# Patient Record
Sex: MALE | Race: WHITE | Employment: OTHER | URBAN - METROPOLITAN AREA
[De-identification: names, ages, dates, MRNs, and addresses within clinical notes are randomized per-mention and may not be internally consistent; named-entity substitution may affect disease eponyms.]

---

## 2024-11-09 RX ORDER — ASPIRIN 81 MG/1
81 TABLET ORAL DAILY
COMMUNITY
Start: 2024-08-26

## 2024-11-14 NOTE — H&P
[facility]    PATIENT'S NAME: TELMA CLEARY   ATTENDING PHYSICIAN: Jeremiah Tapia MD   PATIENT ACCOUNT#:   [id number]    LOCATION:    MEDICAL RECORD #:   [id number]       YOB: 1955  ADMISSION DATE:       11/20/2024    HISTORY AND PHYSICAL EXAMINATION    HISTORY OF PRESENT ILLNESS:  The patient is a 68-year-old gentleman who is being followed for left shoulder pain.  He had radiographs obtained which demonstrate glenohumeral joint osteoarthritis.    PAST MEDICAL HISTORY:  Significant for hypertension, sleep apnea, diabetes, hypothyroidism.    PAST SURGICAL HISTORY:  Significant for hernia repair, rotator cuff repair of his right shoulder, spine surgery x2, total knee replacement.    MEDICATIONS:  His current medications are aspirin, atorvastatin, bupropion, calcium carbonate, vitamin D, candesartan, citalopram, Jardiance, gabapentin, levothyroxine, krill oil, metformin, multivitamins, semaglutide, Toujeo.    PHYSICAL EXAMINATION:  His left shoulder demonstrates his elevation to 150, external rotation to 30, internal rotation to gluteus, crepitus with range of motion exercises.  Anterior and posterior joint line tenderness.  Pain with loading of his joint.  His strength of elevation, external rotation and internal rotation is 4+/5.      Radiographs demonstrate end-stage glenohumeral joint osteoarthritis of the left shoulder.     ASSESSMENT AND PLAN:  The patient is being followed for left glenohumeral joint osteoarthritis.  At this time, he has failed conservative management.  We will proceed with a reverse total shoulder arthroplasty.  We discussed all the risks and complications associated with surgery, and he would like to proceed at this time.      Aminta Martin PA-C, dictating for Jeremiah Tapia MD.     Dictated By Jeremiah Tapia MD  d: 11/14/2024 11:00:23  t: 11/14/2024 11:33:46  [facility] [id number]  /

## 2024-11-15 RX ORDER — LEVOTHYROXINE SODIUM 150 UG/1
150 TABLET ORAL
COMMUNITY
Start: 2024-11-05

## 2024-11-15 RX ORDER — CANDESARTAN 8 MG/1
8 TABLET ORAL DAILY
COMMUNITY
Start: 2024-06-06

## 2024-11-15 RX ORDER — INSULIN GLARGINE 300 U/ML
70 INJECTION, SOLUTION SUBCUTANEOUS NIGHTLY
COMMUNITY
Start: 2024-05-29

## 2024-11-15 RX ORDER — BUPROPION HYDROCHLORIDE 300 MG/1
300 TABLET ORAL DAILY
COMMUNITY
Start: 2024-06-06

## 2024-11-15 RX ORDER — ATORVASTATIN CALCIUM 80 MG/1
80 TABLET, FILM COATED ORAL DAILY
COMMUNITY
Start: 2024-08-07

## 2024-11-15 RX ORDER — CITALOPRAM HYDROBROMIDE 20 MG/1
20 TABLET ORAL DAILY
COMMUNITY
Start: 2024-11-05

## 2024-11-15 RX ORDER — CALCIUM CARBONATE/VITAMIN D3 500 MG-10
TABLET ORAL
COMMUNITY

## 2024-11-15 RX ORDER — GABAPENTIN 300 MG/1
2 CAPSULE ORAL 2 TIMES DAILY
COMMUNITY
Start: 2024-11-05

## 2024-11-20 ENCOUNTER — ANESTHESIA EVENT (OUTPATIENT)
Dept: SURGERY | Facility: HOSPITAL | Age: 69
End: 2024-11-20
Payer: MEDICARE

## 2024-11-20 ENCOUNTER — APPOINTMENT (OUTPATIENT)
Dept: GENERAL RADIOLOGY | Facility: HOSPITAL | Age: 69
End: 2024-11-20
Attending: ORTHOPAEDIC SURGERY
Payer: MEDICARE

## 2024-11-20 ENCOUNTER — HOSPITAL ENCOUNTER (OUTPATIENT)
Facility: HOSPITAL | Age: 69
Discharge: HOME | End: 2024-11-21
Attending: ORTHOPAEDIC SURGERY | Admitting: ORTHOPAEDIC SURGERY
Payer: MEDICARE

## 2024-11-20 ENCOUNTER — ANESTHESIA (OUTPATIENT)
Dept: SURGERY | Facility: HOSPITAL | Age: 69
End: 2024-11-20
Payer: MEDICARE

## 2024-11-20 PROBLEM — M19.012 OSTEOARTHRITIS OF LEFT GLENOHUMERAL JOINT: Status: ACTIVE | Noted: 2024-11-20

## 2024-11-20 PROBLEM — G47.33 OSA (OBSTRUCTIVE SLEEP APNEA): Status: ACTIVE | Noted: 2024-11-20

## 2024-11-20 PROBLEM — E78.5 HYPERLIPIDEMIA: Status: ACTIVE | Noted: 2024-11-20

## 2024-11-20 PROBLEM — Z79.4 INSULIN DEPENDENT TYPE 2 DIABETES MELLITUS (HCC): Status: ACTIVE | Noted: 2024-11-20

## 2024-11-20 PROBLEM — I10 ESSENTIAL HYPERTENSION: Status: ACTIVE | Noted: 2024-11-20

## 2024-11-20 PROBLEM — E10.9 INSULIN DEPENDENT DIABETES MELLITUS TYPE IA (HCC): Status: ACTIVE | Noted: 2024-11-20

## 2024-11-20 PROBLEM — Z96.612 S/P REVERSE TOTAL SHOULDER ARTHROPLASTY, LEFT: Status: ACTIVE | Noted: 2024-11-20

## 2024-11-20 PROBLEM — E11.9 INSULIN DEPENDENT TYPE 2 DIABETES MELLITUS (HCC): Status: ACTIVE | Noted: 2024-11-20

## 2024-11-20 PROBLEM — E03.9 HYPOTHYROIDISM: Status: ACTIVE | Noted: 2024-11-20

## 2024-11-20 PROBLEM — E10.9 INSULIN DEPENDENT DIABETES MELLITUS TYPE IA (HCC): Status: RESOLVED | Noted: 2024-11-20 | Resolved: 2024-11-20

## 2024-11-20 LAB
GLUCOSE BLDC GLUCOMTR-MCNC: 103 MG/DL (ref 70–99)
GLUCOSE BLDC GLUCOMTR-MCNC: 119 MG/DL (ref 70–99)
GLUCOSE BLDC GLUCOMTR-MCNC: 123 MG/DL (ref 70–99)
GLUCOSE BLDC GLUCOMTR-MCNC: 182 MG/DL (ref 70–99)
GLUCOSE BLDC GLUCOMTR-MCNC: 244 MG/DL (ref 70–99)

## 2024-11-20 PROCEDURE — 99222 1ST HOSP IP/OBS MODERATE 55: CPT | Performed by: HOSPITALIST

## 2024-11-20 PROCEDURE — 0LS40ZZ REPOSITION LEFT UPPER ARM TENDON, OPEN APPROACH: ICD-10-PCS | Performed by: ORTHOPAEDIC SURGERY

## 2024-11-20 PROCEDURE — 73020 X-RAY EXAM OF SHOULDER: CPT | Performed by: ORTHOPAEDIC SURGERY

## 2024-11-20 PROCEDURE — 0RRK00Z REPLACEMENT OF LEFT SHOULDER JOINT WITH REVERSE BALL AND SOCKET SYNTHETIC SUBSTITUTE, OPEN APPROACH: ICD-10-PCS | Performed by: ORTHOPAEDIC SURGERY

## 2024-11-20 DEVICE — IMPLANTABLE DEVICE
Type: IMPLANTABLE DEVICE | Site: SHOULDER | Status: FUNCTIONAL
Brand: COMPREHENSIVE® VERSA-DIAL®

## 2024-11-20 DEVICE — IMPLANTABLE DEVICE
Type: IMPLANTABLE DEVICE | Site: SHOULDER | Status: FUNCTIONAL
Brand: COMPREHENSIVE® REVERSE SHOULDER

## 2024-11-20 DEVICE — IMPLANTABLE DEVICE
Type: IMPLANTABLE DEVICE | Site: SHOULDER | Status: FUNCTIONAL
Brand: COMPREHENSIVE® VIVACIT-E®

## 2024-11-20 DEVICE — IMPLANTABLE DEVICE
Type: IMPLANTABLE DEVICE | Site: SHOULDER | Status: FUNCTIONAL
Brand: IDENTITY™

## 2024-11-20 DEVICE — IMPLANTABLE DEVICE: Type: IMPLANTABLE DEVICE | Site: SHOULDER | Status: FUNCTIONAL

## 2024-11-20 RX ORDER — DEXAMETHASONE SODIUM PHOSPHATE 4 MG/ML
VIAL (ML) INJECTION AS NEEDED
Status: DISCONTINUED | OUTPATIENT
Start: 2024-11-20 | End: 2024-11-20 | Stop reason: SURG

## 2024-11-20 RX ORDER — HYDROCODONE BITARTRATE AND ACETAMINOPHEN 10; 325 MG/1; MG/1
2 TABLET ORAL EVERY 6 HOURS PRN
Status: DISCONTINUED | OUTPATIENT
Start: 2024-11-20 | End: 2024-11-21

## 2024-11-20 RX ORDER — MORPHINE SULFATE 4 MG/ML
4 INJECTION, SOLUTION INTRAMUSCULAR; INTRAVENOUS EVERY 10 MIN PRN
Status: DISCONTINUED | OUTPATIENT
Start: 2024-11-20 | End: 2024-11-20 | Stop reason: HOSPADM

## 2024-11-20 RX ORDER — ONDANSETRON 2 MG/ML
4 INJECTION INTRAMUSCULAR; INTRAVENOUS EVERY 6 HOURS PRN
Status: DISCONTINUED | OUTPATIENT
Start: 2024-11-20 | End: 2024-11-21

## 2024-11-20 RX ORDER — ROCURONIUM BROMIDE 10 MG/ML
INJECTION, SOLUTION INTRAVENOUS AS NEEDED
Status: DISCONTINUED | OUTPATIENT
Start: 2024-11-20 | End: 2024-11-20 | Stop reason: SURG

## 2024-11-20 RX ORDER — HYDROMORPHONE HYDROCHLORIDE 1 MG/ML
0.4 INJECTION, SOLUTION INTRAMUSCULAR; INTRAVENOUS; SUBCUTANEOUS EVERY 5 MIN PRN
Status: DISCONTINUED | OUTPATIENT
Start: 2024-11-20 | End: 2024-11-20 | Stop reason: HOSPADM

## 2024-11-20 RX ORDER — NICOTINE POLACRILEX 4 MG
15 LOZENGE BUCCAL
Status: DISCONTINUED | OUTPATIENT
Start: 2024-11-20 | End: 2024-11-21

## 2024-11-20 RX ORDER — GLYCOPYRROLATE 0.2 MG/ML
INJECTION, SOLUTION INTRAMUSCULAR; INTRAVENOUS AS NEEDED
Status: DISCONTINUED | OUTPATIENT
Start: 2024-11-20 | End: 2024-11-20 | Stop reason: SURG

## 2024-11-20 RX ORDER — SODIUM PHOSPHATE, DIBASIC AND SODIUM PHOSPHATE, MONOBASIC 7; 19 G/230ML; G/230ML
1 ENEMA RECTAL ONCE AS NEEDED
Status: DISCONTINUED | OUTPATIENT
Start: 2024-11-20 | End: 2024-11-21

## 2024-11-20 RX ORDER — VANCOMYCIN HYDROCHLORIDE
15 ONCE
Status: COMPLETED | OUTPATIENT
Start: 2024-11-20 | End: 2024-11-20

## 2024-11-20 RX ORDER — NICOTINE POLACRILEX 4 MG
30 LOZENGE BUCCAL
Status: DISCONTINUED | OUTPATIENT
Start: 2024-11-20 | End: 2024-11-20 | Stop reason: HOSPADM

## 2024-11-20 RX ORDER — LEVOTHYROXINE SODIUM 75 UG/1
150 TABLET ORAL
Status: DISCONTINUED | OUTPATIENT
Start: 2024-11-21 | End: 2024-11-21

## 2024-11-20 RX ORDER — NICOTINE POLACRILEX 4 MG
30 LOZENGE BUCCAL
Status: DISCONTINUED | OUTPATIENT
Start: 2024-11-20 | End: 2024-11-21

## 2024-11-20 RX ORDER — LOSARTAN POTASSIUM 50 MG/1
50 TABLET ORAL DAILY
Status: DISCONTINUED | OUTPATIENT
Start: 2024-11-21 | End: 2024-11-21

## 2024-11-20 RX ORDER — METOCLOPRAMIDE HYDROCHLORIDE 5 MG/ML
10 INJECTION INTRAMUSCULAR; INTRAVENOUS EVERY 8 HOURS PRN
Status: DISCONTINUED | OUTPATIENT
Start: 2024-11-20 | End: 2024-11-21

## 2024-11-20 RX ORDER — DOCUSATE SODIUM 100 MG/1
100 CAPSULE, LIQUID FILLED ORAL 2 TIMES DAILY
Status: DISCONTINUED | OUTPATIENT
Start: 2024-11-20 | End: 2024-11-21

## 2024-11-20 RX ORDER — POLYETHYLENE GLYCOL 3350 17 G/17G
17 POWDER, FOR SOLUTION ORAL DAILY PRN
Status: DISCONTINUED | OUTPATIENT
Start: 2024-11-20 | End: 2024-11-21

## 2024-11-20 RX ORDER — DEXTROSE MONOHYDRATE 25 G/50ML
50 INJECTION, SOLUTION INTRAVENOUS
Status: DISCONTINUED | OUTPATIENT
Start: 2024-11-20 | End: 2024-11-20 | Stop reason: HOSPADM

## 2024-11-20 RX ORDER — INSULIN DEGLUDEC 100 U/ML
70 INJECTION, SOLUTION SUBCUTANEOUS NIGHTLY
Status: DISCONTINUED | OUTPATIENT
Start: 2024-11-20 | End: 2024-11-21

## 2024-11-20 RX ORDER — BUPROPION HYDROCHLORIDE 150 MG/1
300 TABLET ORAL DAILY
Status: DISCONTINUED | OUTPATIENT
Start: 2024-11-21 | End: 2024-11-21

## 2024-11-20 RX ORDER — HYDROMORPHONE HYDROCHLORIDE 1 MG/ML
0.6 INJECTION, SOLUTION INTRAMUSCULAR; INTRAVENOUS; SUBCUTANEOUS EVERY 5 MIN PRN
Status: DISCONTINUED | OUTPATIENT
Start: 2024-11-20 | End: 2024-11-20 | Stop reason: HOSPADM

## 2024-11-20 RX ORDER — NALOXONE HYDROCHLORIDE 0.4 MG/ML
0.08 INJECTION, SOLUTION INTRAMUSCULAR; INTRAVENOUS; SUBCUTANEOUS AS NEEDED
Status: DISCONTINUED | OUTPATIENT
Start: 2024-11-20 | End: 2024-11-20 | Stop reason: HOSPADM

## 2024-11-20 RX ORDER — LIDOCAINE HYDROCHLORIDE 20 MG/ML
INJECTION, SOLUTION EPIDURAL; INFILTRATION; INTRACAUDAL; PERINEURAL AS NEEDED
Status: DISCONTINUED | OUTPATIENT
Start: 2024-11-20 | End: 2024-11-20 | Stop reason: SURG

## 2024-11-20 RX ORDER — SODIUM CHLORIDE, SODIUM LACTATE, POTASSIUM CHLORIDE, CALCIUM CHLORIDE 600; 310; 30; 20 MG/100ML; MG/100ML; MG/100ML; MG/100ML
INJECTION, SOLUTION INTRAVENOUS CONTINUOUS
Status: DISCONTINUED | OUTPATIENT
Start: 2024-11-20 | End: 2024-11-21

## 2024-11-20 RX ORDER — ACETAMINOPHEN 500 MG
1000 TABLET ORAL ONCE
Status: COMPLETED | OUTPATIENT
Start: 2024-11-20 | End: 2024-11-20

## 2024-11-20 RX ORDER — MORPHINE SULFATE 4 MG/ML
2 INJECTION, SOLUTION INTRAMUSCULAR; INTRAVENOUS EVERY 10 MIN PRN
Status: DISCONTINUED | OUTPATIENT
Start: 2024-11-20 | End: 2024-11-20 | Stop reason: HOSPADM

## 2024-11-20 RX ORDER — ATORVASTATIN CALCIUM 40 MG/1
80 TABLET, FILM COATED ORAL DAILY
Status: DISCONTINUED | OUTPATIENT
Start: 2024-11-21 | End: 2024-11-21

## 2024-11-20 RX ORDER — HYDROMORPHONE HYDROCHLORIDE 1 MG/ML
0.2 INJECTION, SOLUTION INTRAMUSCULAR; INTRAVENOUS; SUBCUTANEOUS EVERY 5 MIN PRN
Status: DISCONTINUED | OUTPATIENT
Start: 2024-11-20 | End: 2024-11-20 | Stop reason: HOSPADM

## 2024-11-20 RX ORDER — DIPHENHYDRAMINE HYDROCHLORIDE 50 MG/ML
25 INJECTION INTRAMUSCULAR; INTRAVENOUS ONCE AS NEEDED
Status: ACTIVE | OUTPATIENT
Start: 2024-11-20 | End: 2024-11-20

## 2024-11-20 RX ORDER — PHENYLEPHRINE HCL 10 MG/ML
VIAL (ML) INJECTION AS NEEDED
Status: DISCONTINUED | OUTPATIENT
Start: 2024-11-20 | End: 2024-11-20 | Stop reason: SURG

## 2024-11-20 RX ORDER — SODIUM CHLORIDE, SODIUM LACTATE, POTASSIUM CHLORIDE, CALCIUM CHLORIDE 600; 310; 30; 20 MG/100ML; MG/100ML; MG/100ML; MG/100ML
INJECTION, SOLUTION INTRAVENOUS CONTINUOUS
Status: DISCONTINUED | OUTPATIENT
Start: 2024-11-20 | End: 2024-11-20 | Stop reason: HOSPADM

## 2024-11-20 RX ORDER — FAMOTIDINE 20 MG/1
20 TABLET, FILM COATED ORAL ONCE
Status: DISCONTINUED | OUTPATIENT
Start: 2024-11-20 | End: 2024-11-20 | Stop reason: HOSPADM

## 2024-11-20 RX ORDER — TRANEXAMIC ACID 10 MG/ML
INJECTION, SOLUTION INTRAVENOUS AS NEEDED
Status: DISCONTINUED | OUTPATIENT
Start: 2024-11-20 | End: 2024-11-20 | Stop reason: SURG

## 2024-11-20 RX ORDER — ESCITALOPRAM OXALATE 5 MG/1
20 TABLET ORAL DAILY
Status: DISCONTINUED | OUTPATIENT
Start: 2024-11-21 | End: 2024-11-21

## 2024-11-20 RX ORDER — MORPHINE SULFATE 10 MG/ML
6 INJECTION, SOLUTION INTRAMUSCULAR; INTRAVENOUS EVERY 10 MIN PRN
Status: DISCONTINUED | OUTPATIENT
Start: 2024-11-20 | End: 2024-11-20 | Stop reason: HOSPADM

## 2024-11-20 RX ORDER — MORPHINE SULFATE 15 MG/1
15 TABLET, FILM COATED, EXTENDED RELEASE ORAL EVERY 12 HOURS SCHEDULED
Status: DISCONTINUED | OUTPATIENT
Start: 2024-11-20 | End: 2024-11-21

## 2024-11-20 RX ORDER — NICOTINE POLACRILEX 4 MG
15 LOZENGE BUCCAL
Status: DISCONTINUED | OUTPATIENT
Start: 2024-11-20 | End: 2024-11-20 | Stop reason: HOSPADM

## 2024-11-20 RX ORDER — MIDAZOLAM HYDROCHLORIDE 1 MG/ML
INJECTION INTRAMUSCULAR; INTRAVENOUS AS NEEDED
Status: DISCONTINUED | OUTPATIENT
Start: 2024-11-20 | End: 2024-11-20 | Stop reason: SURG

## 2024-11-20 RX ORDER — ONDANSETRON 2 MG/ML
INJECTION INTRAMUSCULAR; INTRAVENOUS AS NEEDED
Status: DISCONTINUED | OUTPATIENT
Start: 2024-11-20 | End: 2024-11-20 | Stop reason: SURG

## 2024-11-20 RX ORDER — GABAPENTIN 300 MG/1
600 CAPSULE ORAL 2 TIMES DAILY
Status: DISCONTINUED | OUTPATIENT
Start: 2024-11-20 | End: 2024-11-21

## 2024-11-20 RX ORDER — SENNOSIDES 8.6 MG
17.2 TABLET ORAL NIGHTLY
Status: DISCONTINUED | OUTPATIENT
Start: 2024-11-20 | End: 2024-11-21

## 2024-11-20 RX ORDER — FAMOTIDINE 10 MG/ML
20 INJECTION, SOLUTION INTRAVENOUS ONCE
Status: DISCONTINUED | OUTPATIENT
Start: 2024-11-20 | End: 2024-11-20 | Stop reason: HOSPADM

## 2024-11-20 RX ORDER — HYDROCODONE BITARTRATE AND ACETAMINOPHEN 10; 325 MG/1; MG/1
1 TABLET ORAL EVERY 6 HOURS PRN
Status: DISCONTINUED | OUTPATIENT
Start: 2024-11-20 | End: 2024-11-21

## 2024-11-20 RX ORDER — BISACODYL 10 MG
10 SUPPOSITORY, RECTAL RECTAL
Status: DISCONTINUED | OUTPATIENT
Start: 2024-11-20 | End: 2024-11-21

## 2024-11-20 RX ORDER — DEXTROSE MONOHYDRATE 25 G/50ML
50 INJECTION, SOLUTION INTRAVENOUS
Status: DISCONTINUED | OUTPATIENT
Start: 2024-11-20 | End: 2024-11-21

## 2024-11-20 RX ADMIN — PHENYLEPHRINE HCL 100 MCG: 10 MG/ML VIAL (ML) INJECTION at 11:56:00

## 2024-11-20 RX ADMIN — ONDANSETRON 4 MG: 2 INJECTION INTRAMUSCULAR; INTRAVENOUS at 13:00:00

## 2024-11-20 RX ADMIN — PHENYLEPHRINE HCL 100 MCG: 10 MG/ML VIAL (ML) INJECTION at 11:30:00

## 2024-11-20 RX ADMIN — PHENYLEPHRINE HCL 100 MCG: 10 MG/ML VIAL (ML) INJECTION at 12:03:00

## 2024-11-20 RX ADMIN — TRANEXAMIC ACID 1000 MG: 10 INJECTION, SOLUTION INTRAVENOUS at 11:30:00

## 2024-11-20 RX ADMIN — GLYCOPYRROLATE 0.15 MG: 0.2 INJECTION, SOLUTION INTRAMUSCULAR; INTRAVENOUS at 11:23:00

## 2024-11-20 RX ADMIN — PHENYLEPHRINE HCL 100 MCG: 10 MG/ML VIAL (ML) INJECTION at 11:51:00

## 2024-11-20 RX ADMIN — DEXAMETHASONE SODIUM PHOSPHATE 4 MG: 4 MG/ML VIAL (ML) INJECTION at 11:27:00

## 2024-11-20 RX ADMIN — PHENYLEPHRINE HCL 100 MCG: 10 MG/ML VIAL (ML) INJECTION at 12:46:00

## 2024-11-20 RX ADMIN — PHENYLEPHRINE HCL 100 MCG: 10 MG/ML VIAL (ML) INJECTION at 12:56:00

## 2024-11-20 RX ADMIN — LIDOCAINE HYDROCHLORIDE 80 MG: 20 INJECTION, SOLUTION EPIDURAL; INFILTRATION; INTRACAUDAL; PERINEURAL at 11:23:00

## 2024-11-20 RX ADMIN — TRANEXAMIC ACID 1000 MG: 10 INJECTION, SOLUTION INTRAVENOUS at 12:59:00

## 2024-11-20 RX ADMIN — PHENYLEPHRINE HCL 100 MCG: 10 MG/ML VIAL (ML) INJECTION at 11:41:00

## 2024-11-20 RX ADMIN — PHENYLEPHRINE HCL 100 MCG: 10 MG/ML VIAL (ML) INJECTION at 12:09:00

## 2024-11-20 RX ADMIN — MIDAZOLAM HYDROCHLORIDE 2 MG: 1 INJECTION INTRAMUSCULAR; INTRAVENOUS at 10:52:00

## 2024-11-20 RX ADMIN — SODIUM CHLORIDE, SODIUM LACTATE, POTASSIUM CHLORIDE, CALCIUM CHLORIDE: 600; 310; 30; 20 INJECTION, SOLUTION INTRAVENOUS at 12:10:00

## 2024-11-20 RX ADMIN — ROCURONIUM BROMIDE 5 MG: 10 INJECTION, SOLUTION INTRAVENOUS at 11:23:00

## 2024-11-20 RX ADMIN — SODIUM CHLORIDE, SODIUM LACTATE, POTASSIUM CHLORIDE, CALCIUM CHLORIDE: 600; 310; 30; 20 INJECTION, SOLUTION INTRAVENOUS at 13:21:00

## 2024-11-20 RX ADMIN — SODIUM CHLORIDE, SODIUM LACTATE, POTASSIUM CHLORIDE, CALCIUM CHLORIDE: 600; 310; 30; 20 INJECTION, SOLUTION INTRAVENOUS at 11:18:00

## 2024-11-20 RX ADMIN — PHENYLEPHRINE HCL 100 MCG: 10 MG/ML VIAL (ML) INJECTION at 11:36:00

## 2024-11-20 NOTE — ANESTHESIA PROCEDURE NOTES
Peripheral Block    Date/Time: 11/20/2024 10:52 AM    Performed by: Ramirez Min MD  Authorized by: Ramirez Min MD      General Information and Staff    Start Time:  11/20/2024 10:52 AM  End Time:  11/20/2024 10:58 AM  Anesthesiologist:  Ramirez Min MD  Performed by:  Anesthesiologist  Patient Location:  PACU    Block Placement: Pre Induction  Site Identification: real time ultrasound guided and image stored and retrievable      Reason for Block: at surgeon's request and post-op pain management    Preanesthetic Checklist: 2 patient identifers, IV checked, risks and benefits discussed, monitors and equipment checked, pre-op evaluation, timeout performed, anesthesia consent and sterile technique used      Procedure Details    Patient Position:  Sitting  Prep: ChloraPrep    Monitoring:  Cardiac monitor  Block Type:  Interscalene  Laterality:  Left  Injection Technique:  Single-shot    Needle    Needle Type:  Short-bevel  Needle Gauge:  22 G  Needle Length:  50 mm  Needle Localization:  Ultrasound guidance      Nerve Stimulator: 0.4 amps  Muscle Twitch Response: biceps response and deltoid response      Assessment    Injection Assessment:  Good spread noted, incremental injection, local visualized surrounding nerve on ultrasound, low pressure, negative aspiration for heme and no pain on injection  Heart Rate Change: No        Medications  11/20/2024 10:52 AM      Additional Comments    Patient had red men syndrome after vanco. Zofran given phenylephrine for hypotension. Block performed after condition improved.

## 2024-11-20 NOTE — CONSULTS
[facility]    PATIENT'S NAME: TELMA CLEARY   ATTENDING PHYSICIAN: Jeremiah Tapia MD   CONSULTING PHYSICIAN: Jaycob Garcia MD   PATIENT ACCOUNT#:   [id number]    LOCATION:  [facility] PACU 3 [facility] 10  MEDICAL RECORD #:   [id number]       YOB: 1955  ADMISSION DATE:       11/20/2024      CONSULT DATE:  11/20/2024    REPORT OF CONSULTATION      REASON FOR ADMISSION:  Post left shoulder arthroplasty.    HISTORY OF PRESENT ILLNESS:  Patient is a 68-year-old  male with a history of bilateral shoulder rotator cuff arthropathy, left worse than right, failed outpatient conservative medical management options.  Scheduled today for above-mentioned procedure by his orthopedic surgeon, Dr. Tapia.  Preoperatively, he had interscalene block.  Postoperatively, transferred to PACU for further monitoring.    PAST MEDICAL HISTORY:  Generalized osteoarthritis, anxiety, degenerative joint disease of lumbar spine, depression, hypothyroidism, diabetes mellitus type 2, hypertension, hyperlipidemia, obstructive sleep apnea, obesity.    PAST SURGICAL HISTORY:  Right shoulder rotator cuff repair, lumbar laminectomy and fusion, hernia repair, and bilateral total knee arthroplasties.    MEDICATIONS:  Please see medication reconciliation list.     ALLERGIES:  Penicillin.    SOCIAL HISTORY:  No tobacco, alcohol, or drug use.  Lives with his family.  Independent in his basic activities of daily living.     FAMILY HISTORY:  Positive for hypertension.      REVIEW OF SYSTEMS:  Currently resting in bed.  No left shoulder pain.  No chest pain.  No shortness of breath.  Other 12-point review of systems is negative.       PHYSICAL EXAMINATION:    GENERAL:  Alert and oriented to time, place and person.  No acute distress.   VITAL SIGNS:  Temperature 97.1, pulse 93, respiratory rate 12, blood pressure 114/72, pulse ox 97% on 4L nasal cannula oxygen.   HEENT:  Atraumatic.  Oropharynx clear.  Moist mucous membranes.   Ears and nose normal.  Eyes:  Anicteric sclerae.   NECK:  Supple.  No lymphadenopathy.  Trachea midline.  Full range of motion.   LUNGS:  Clear to auscultation bilaterally.  Normal respiratory effort.    HEART:  Regular rate and rhythm.  S1 and S2 auscultated.  No murmur.    ABDOMEN:  Soft, nondistended.  No tenderness.  Positive bowel sounds.   EXTREMITIES:  Left shoulder dressing.  Sling immobilizer.    NEUROLOGIC:  Decreased sensation and muscle movement in the left upper extremity, post spinal block.  No other focal findings.    ASSESSMENT AND PLAN:    1.   Left shoulder rotator cuff arthropathy, status post left shoulder reverse total arthroplasty.  Interscalene block.  Pain control.  Neurovascular checks.  DVT prophylaxis.  Physical and occupational therapy.  2.   Essential hypertension.  Continue home medications and monitor.   3.   Obstructive sleep apnea.  Apply obstructive sleep apnea protocol and monitor.    4.   Insulin-dependent diabetes mellitus type 2.  Continue home medications.  Monitor Accu-Cheks.  Sliding scale insulin.  5.   Hyperlipidemia.  Continue home medications.  6.   Hypothyroidism.  Continue home medications.     Dictated By Jaycob Garcia MD  d: 11/20/2024 13:55:00  t: 11/20/2024 14:12:07  Job [id number]  FB/

## 2024-11-20 NOTE — ANESTHESIA POSTPROCEDURE EVALUATION
Patient: Anil Morillo    Procedure Summary       Date: 11/20/24 Room / Location: [facility] MAIN OR  / [facility] MAIN OR    Anesthesia Start: 1118 Anesthesia Stop:     Procedure: Left reverse total shoulder arthroplasty (Left: Shoulder) Diagnosis: (Osteoarthritis)    Surgeons: Jeremiah Tapia MD Anesthesiologist: Belle Min MD    Anesthesia Type: general ASA Status: 3            Anesthesia Type: general    Vitals Value Taken Time   /77 11/20/24 1321   Temp 97.1 °F (36.2 °C) 11/20/24 1321   Pulse 99 11/20/24 1321   Resp 18 11/20/24 1321   SpO2 96 % 11/20/24 1321       [facility] AN Post Evaluation:   Patient Evaluated in PACU  Patient Participation: complete - patient participated  Level of Consciousness: awake  Pain Management: adequate  Airway Patency:patent  Dental exam unchanged from preop  Yes    Cardiovascular Status: acceptable  Respiratory Status: acceptable  Postoperative Hydration acceptable      BELLE MIN MD  11/20/2024 1:23 PM

## 2024-11-20 NOTE — ANESTHESIA PREPROCEDURE EVALUATION
Anesthesia PreOp Note    HPI:     Anil Morillo is a 68 year old male who presents for preoperative consultation requested by: Jeremiah Tapia MD    Date of Surgery: 11/20/2024    Procedure(s):  Left anatomic, possible reverse total shoulder arthroplasty  Indication: Osteoarthritis    Relevant Problems   No relevant active problems       NPO:  Last Liquid Consumption Date: 11/20/24  Last Liquid Consumption Time: 0630  Last Solid Consumption Date: 11/19/24  Last Solid Consumption Time: 2000  Last Liquid Consumption Date: 11/20/24          History Review:  There are no active problems to display for this patient.      Past Medical History:   • Anxiety state   • Back problem   • BPH (benign prostatic hyperplasia)   • Depression   • Dermatitis   • Diabetes (HCC)   • Disorder of thyroid   • Hearing impaired person, bilateral   • High blood pressure   • High cholesterol   • History of blood transfusion   • Osteoarthritis   • Sleep apnea       Past Surgical History:   Procedure Laterality Date   • Arthroscopy of joint unlisted     • Cath percutaneous  transluminal coronary angioplasty     • Colonoscopy     • Hernia surgery     • Spine surgery procedure unlisted      lumbar fusion   • Total knee replacement Bilateral        Prescriptions Prior to Admission[1]  Current Medications and Prescriptions Ordered in Epic[2]    Allergies[3]    History reviewed. No pertinent family history.  Social History     Socioeconomic History   • Marital status:    Tobacco Use   • Smoking status: Never   • Smokeless tobacco: Never   Vaping Use   • Vaping status: Never Used   Substance and Sexual Activity   • Alcohol use: Not Currently   • Drug use: Never       Available pre-op labs reviewed.             Vital Signs:  Body mass index is 34.91 kg/m².   height is 1.676 m (5' 6\") and weight is 98.1 kg (216 lb 4.8 oz). His oral temperature is 98.2 °F (36.8 °C). His blood pressure is 119/70 and his pulse is 68. His respiration is 18  and oxygen saturation is 95%.   Vitals:    11/15/24 1433 11/20/24 0840   BP:  119/70   Pulse:  68   Resp:  18   Temp:  98.2 °F (36.8 °C)   TempSrc:  Oral   SpO2:  95%   Weight: 99.3 kg (219 lb) 98.1 kg (216 lb 4.8 oz)   Height: 1.676 m (5' 6\") 1.676 m (5' 6\")        Anesthesia Evaluation      Airway   Mallampati: II  TM distance: >3 FB  Neck ROM: full  Dental          Pulmonary - normal exam   (+) sleep apnea  Cardiovascular - normal exam  (+) hypertension    Neuro/Psych    (+)  neuromuscular disease, anxiety/panic attacks,  depression      GI/Hepatic/Renal      Endo/Other    (+) diabetes mellitus, arthritis  Abdominal   (+) obese               Anesthesia Plan:   ASA:  3  Plan:   General  Airway:  ETT  Post-op Pain Management: Interscalene block  Informed Consent Plan and Risks Discussed With:  Patient      I have informed Anil Richardsoctavio and/or legal guardian or family member of the nature of the anesthetic plan, benefits, risks including possible dental damage if relevant, major complications, and any alternative forms of anesthetic management.   All of the patient's questions were answered to the best of my ability. The patient desires the anesthetic management as planned.  BELLE GONZALES MD  11/20/2024 8:55 AM  Present on Admission:  **None**           [1]   Medications Prior to Admission   Medication Sig Dispense Refill Last Dose/Taking   • Insulin Glargine, 2 Unit Dial, (TOUJEO MAX SOLOSTAR) 300 UNIT/ML Subcutaneous Solution Pen-injector Inject 70 Units into the skin nightly.   11/19/2024 at  8:00 AM   • atorvastatin 80 MG Oral Tab Take 1 tablet (80 mg total) by mouth daily.   11/19/2024 at  4:00 PM   • buPROPion  MG Oral Tablet 24 Hr Take 1 tablet (300 mg total) by mouth daily.   11/20/2024 at  6:30 AM   • citalopram 20 MG Oral Tab Take 1 tablet (20 mg total) by mouth daily.   11/20/2024 at  6:30 AM   • Candesartan Cilexetil 8 MG Oral Tab Take 1 tablet (8 mg total) by mouth daily.    11/19/2024 at  8:00 AM   • Calcium Carb-Cholecalciferol 500-10 MG-MCG Oral Tab take 1 tablet by oral route 2 times a day   11/12/2024   • levothyroxine 150 MCG Oral Tab Take 1 tablet (150 mcg total) by mouth.   11/20/2024 at  6:30 AM   • gabapentin 300 MG Oral Cap Take 2 capsules (600 mg total) by mouth 2 (two) times daily.   11/20/2024 at  6:30 AM   • metFORMIN HCl 1000 MG Oral Tab Take 1 tablet (1,000 mg total) by mouth 2 (two) times daily.   11/19/2024   • semaglutide 2 MG/3ML Subcutaneous Solution Pen-injector Inject 0.5 mg into the skin every 7 days.   11/8/2024   • aspirin 81 MG Oral Tab EC Take 1 tablet (81 mg total) by mouth daily.   11/12/2024   • empagliflozin 25 MG Oral Tab Take 1 tablet (25 mg total) by mouth daily with breakfast.   11/15/2024   • KRILL OIL OR Take by mouth daily.   11/12/2024   [2]   Current Facility-Administered Medications Ordered in Epic   Medication Dose Route Frequency Provider Last Rate Last Admin   • lactated ringers infusion   Intravenous Continuous Jeremiah Tapia MD       • famotidine (Pepcid) tab 20 mg  20 mg Oral Once Jeremiah Tapia MD        Or   • famotidine (Pepcid) 20 mg/2mL injection 20 mg  20 mg Intravenous Once Jeremiah Tapia MD       • ceFAZolin (Ancef) 2g in 10mL IV syringe premix  2 g Intravenous Once Aminta Martin PA       • vancomycin (Vancocin) 1.5 g in sodium chloride 0.9% 250mL IVPB premix  15 mg/kg Intravenous Once Aminta Martin PA         No current [facility]-ordered outpatient medications on file.   [3]   Allergies  Allergen Reactions   • Penicillins HIVES and RASH     No blistering , no skin peeling

## 2024-11-20 NOTE — INTERVAL H&P NOTE
Pre-op Diagnosis: Osteoarthritis    The above referenced H&P was reviewed by GABY HERNANDEZ PA on 11/20/2024, the patient was examined and no significant changes have occurred in the patient's condition since the H&P was performed.  I discussed with the patient and/or legal representative the potential benefits, risks and side effects of this procedure; the likelihood of the patient achieving goals; and potential problems that might occur during recuperation.  I discussed reasonable alternatives to the procedure, including risks, benefits and side effects related to the alternatives and risks related to not receiving this procedure.  We will proceed with procedure as planned.

## 2024-11-20 NOTE — ANESTHESIA PROCEDURE NOTES
Airway  Date/Time: 11/20/2024 11:26 AM  Urgency: Elective      General Information and Staff    Patient location during procedure: OR  Anesthesiologist: Ramirez Min MD  Resident/CRNA: Adrianne Barney CRNA  Performed: CRNA   Performed by: Adrianne Barney CRNA  Authorized by: Ramirez Min MD      Indications and Patient Condition  Indications for airway management: anesthesia  Sedation level: deep  Preoxygenated: yes  Patient position: sniffing  Mask difficulty assessment: 0 - not attempted    Final Airway Details  Final airway type: endotracheal airway      Successful airway: ETT  Cuffed: yes   Successful intubation technique: Video laryngoscopy  Endotracheal tube insertion site: oral  Blade: GlideScope  Blade size: #4  ETT size (mm): 8.0    Cormack-Lehane Classification: grade I - full view of glottis  Placement verified by: capnometry   Measured from: teeth  ETT to teeth (cm): 21  Number of attempts at approach: 1    Additional Comments  Easy, atraumatic intubation. Dentition, lips and mucosa unchanged.

## 2024-11-20 NOTE — BRIEF OP NOTE
Pre-Operative Diagnosis: Osteoarthritis     Post-Operative Diagnosis: Osteoarthritis      Procedure Performed:   Left reverse total shoulder arthroplasty    Surgeons and Role:     * Jeremiah Tapia MD - Primary    Assistant(s):  Surgical Assistant.: Melanie Almanza CSA  PA: Gaby Hernandez PA Ashan Madwand MD    Surgical Findings: same     Specimen: none     Estimated Blood Loss: No data recorded        GABY HERNANDEZ PA  11/20/2024  1:24 PM

## 2024-11-21 VITALS
OXYGEN SATURATION: 94 % | RESPIRATION RATE: 16 BRPM | TEMPERATURE: 98 F | HEART RATE: 77 BPM | HEIGHT: 66 IN | DIASTOLIC BLOOD PRESSURE: 70 MMHG | WEIGHT: 216 LBS | SYSTOLIC BLOOD PRESSURE: 107 MMHG | BODY MASS INDEX: 34.72 KG/M2

## 2024-11-21 LAB
EST. AVERAGE GLUCOSE BLD GHB EST-MCNC: 134 MG/DL (ref 68–126)
GLUCOSE BLDC GLUCOMTR-MCNC: 113 MG/DL (ref 70–99)
GLUCOSE BLDC GLUCOMTR-MCNC: 191 MG/DL (ref 70–99)
HBA1C MFR BLD: 6.3 % (ref ?–5.7)

## 2024-11-21 NOTE — OPERATIVE REPORT
[facility]    PATIENT'S NAME: TELMA CLEARY   ATTENDING PHYSICIAN: Jeremiah Tapia MD   OPERATING PHYSICIAN: Jeremiah Tapia MD   PATIENT ACCOUNT#:   [id number]    LOCATION:   Room 5 A [facility]  MEDICAL RECORD #:   [id number]       YOB: 1955  ADMISSION DATE:       11/20/2024      OPERATION DATE:  11/20/2024    OPERATIVE REPORT      PREOPERATIVE DIAGNOSIS:    1.   Left glenohumeral osteoarthritis.  2.   Biceps tear.  POSTOPERATIVE DIAGNOSIS:    1.   Left glenohumeral osteoarthritis.  2.   Biceps tear.  PROCEDURE:    1.   Left reverse total shoulder arthroplasty.  2.   Left biceps tenodesis.    INDICATIONS:  This is a 68-year-old male with a history of a painful shoulder.  I discussed with the patient treatment options including both operative and non-operative management. The risks and benefits of the procedure were review including but not exclusive to bleeding, infection, neurologic injury, medical related complications, persistent pain, post-operative stiffness, recurrent tear and post-operative weakness. After review of these and a discussion of the need for post-operative therapy to optimize function and results informed consent was obtained. The use of an assistant was required for arm positioning and to assist with utilizing the arthroscope for visualization throughout the course of surgery.    OPERATIVE TECHNIQUE: The patient was brought to the operating room after general anesthetic and interscalene block were induced, the patient was placed in a beach-chair position with all bony prominences padded paying particular attention to placing their head in neutral cervical alignment and padding the contralateral ulna nerve. SCD boots were used for DVT prophylaxis. The patient received preoperative antibiotics and was sterilely prepped and draped.   A time out procedure was performed with the anesthesia and nursing staff to confirm patient identification, procedure and laterality.      An  extended deltopectoral incision was utilized to identify the cephalic vein which was retracted laterally with the deltoid exposing the underlying clavipectoral fascia.  This was incised lateral to the conjoined tendon exposing the underlying subscapularis and biceps tendon sheath.    We initially evaluated the biceps.  Bicipital sheath was opened noting a grossly inflamed torn biceps tendon.  The biceps was tenodesed to the upper portion of the pectoralis major tendon and then tenotomized.  The upper border of the subscapularis and supraspinatus demonstrated high-grade partial tear, so very poor quality tendon.  The subscapularis was released, brought down through an inferior capsular release allowing us to dislocate the shoulder.  We then removed the osteophytes circumferentially, and the proximal humerus was reamed up to a size 10.  The proximal humerus was broached until a stable broach was fitted with both axially and rotationally.  We had performed an osteotomy at the rotator cuff insertion.  The shoulder was then subluxed posterior to the glenoid.  A 360-degree release of the glenoid was performed, and a PCI guide was used to place the pins to the glenoid.  The glenoid was then reamed for a medium glenoid component.  The component was placed with good fixation with screws.  The 40 mm glenosphere was inserted.  We then placed the humeral stem with a -6 tray with 0 liner.  Excellent stability was obtained.  The shoulder was lavaged.  The skin was closed in sequential layered fashion.  Intraoperative x-ray showed excellent position of the replacement.  There were no intraoperative complications.  All sponge and lap counts were correct at the end of the procedure.    Dictated By Jeremiah Tapia MD  d: 11/20/2024 17:19:09  t: 11/21/2024 02:18:37  [facility] [id number]  /

## 2024-11-21 NOTE — DISCHARGE SUMMARY
[facility]  part of [facility]    Discharge Summary    Anil Morillo Patient Status:  Outpatient in a Bed    1955 MRN [id number]   Location [facility] Attending Jennyfer Andujar MD   Hosp Day # 0 PCP Nahun Chacko     Date of Admission: 2024      Date of Discharge: 24      Admitting Diagnosis: Osteoarthritis  S/P reverse total shoulder arthroplasty, left    Hospital Discharge Diagnoses: {Enter hospital discharge diagnoses here (please select the wildcards and then replace with free text; this allows us to save this data discretely for reporting purposes:5961::\"***\"}    Lace+ Score: 30  59-90 High Risk  29-58 Medium Risk  0-28   Low Risk.    TCM Follow-Up Recommendation:  {Care Managers will evaluate the need for follow-up for all patients ages 50+, and high/moderate risk patients ages 25-49. Low risk patients (LACE < 29) will only be evaluated if the \"Still recommend for TCM follow-up\" option is selected from this list.:7396}          Problem List:   Patient Active Problem List   Diagnosis    Osteoarthritis of left glenohumeral joint    ELICEO (obstructive sleep apnea)    Insulin dependent type 2 diabetes mellitus (HCC)    Essential hypertension    Hyperlipidemia    Hypothyroidism    S/P reverse total shoulder arthroplasty, left         Physical Exam: ***    History of Present Illness: ***    Hospital Course: ***    Discharge Condition: {DISCHARGE CONDITION:}    Discharge Medications:      Discharge Medications        CONTINUE taking these medications        Instructions Prescription details   aspirin 81 MG Tbec      Take 1 tablet (81 mg total) by mouth daily.   Refills: 0     atorvastatin 80 MG Tabs  Commonly known as: Lipitor      Take 1 tablet (80 mg total) by mouth daily.   Refills: 0     buPROPion  MG Tb24  Commonly known as: Wellbutrin XL      Take 1 tablet (300 mg total) by mouth daily.   Refills: 0     Calcium Carb-Cholecalciferol  500-10 MG-MCG Tabs      take 1 tablet by oral route 2 times a day   Refills: 0     Candesartan Cilexetil 8 MG Tabs  Commonly known as: ATACAND      Take 1 tablet (8 mg total) by mouth daily.   Refills: 0     citalopram 20 MG Tabs  Commonly known as: CeleXA      Take 1 tablet (20 mg total) by mouth daily.   Refills: 0     empagliflozin 25 MG Tabs  Commonly known as: Jardiance      Take 1 tablet (25 mg total) by mouth daily with breakfast.   Refills: 0     gabapentin 300 MG Caps  Commonly known as: Neurontin      Take 2 capsules (600 mg total) by mouth 2 (two) times daily.   Refills: 0     KRILL OIL OR      Take by mouth daily.   Refills: 0     levothyroxine 150 MCG Tabs  Commonly known as: Synthroid      Take 1 tablet (150 mcg total) by mouth.   Refills: 0     metFORMIN HCl 1000 MG Tabs  Commonly known as: GLUCOPHAGE      Take 1 tablet (1,000 mg total) by mouth 2 (two) times daily.   Refills: 0     semaglutide 2 MG/3ML Sopn  Commonly known as: Ozempic      Inject 0.5 mg into the skin every 7 days.   Refills: 0     Toujeo Max SoloStar 300 UNIT/ML Sopn  Generic drug: Insulin Glargine (2 Unit Dial)      Inject 70 Units into the skin nightly.   Refills: 0                  Jennyfer Andujar MD  11/21/2024  9:10 AM    Greater than 30 minutes spent on preparation and coordination of this discharge

## (undated) DEVICE — COVER,TABLE,60X90,STERILE: Brand: MEDLINE

## (undated) DEVICE — GAMMEX® PI HYBRID SIZE 7, STERILE POWDER-FREE SURGICAL GLOVE, POLYISOPRENE AND NEOPRENE BLEND: Brand: GAMMEX

## (undated) DEVICE — Device

## (undated) DEVICE — SKIN PREP TRAY 4 COMPARTM TRAY: Brand: MEDLINE INDUSTRIES, INC.

## (undated) DEVICE — SOLUTION IRRIG 3000ML 0.9% NACL FLX CONT

## (undated) DEVICE — WRAP COOLING SHLDR W/ICE PILLO

## (undated) DEVICE — HOOD: Brand: FLYTE

## (undated) DEVICE — BLADE SAW 1.14X2.17IN THK0.025IN CUT

## (undated) DEVICE — SUT MCRYL 2-0 27IN SH ABSRB UD 26MM 1/2 CIR

## (undated) DEVICE — SHEET,DRAPE,53X77,STERILE: Brand: MEDLINE

## (undated) DEVICE — SHOULDER STABILIZATION KIT,                                    DISPOSABLE 12 PER BOX

## (undated) DEVICE — PACK CDS SHOULDER

## (undated) DEVICE — BIT DRL 2.7MM PERIPH FOR COMPHSVE REV

## (undated) DEVICE — HANDPIECE SET WITH HIGH FLOW TIP AND SUCTION TUBE: Brand: INTERPULSE

## (undated) DEVICE — SPONGE LAP 18X18IN WHT COT 4 PLY FLD STRUNG

## (undated) DEVICE — BEACH CHAIR MASK SGL USE

## (undated) DEVICE — SUT ETHBND XL 2 30IN V-37 NABSRB GRN 40MM 1/2

## (undated) DEVICE — GAMMEX® PI HYBRID SIZE 9, STERILE POWDER-FREE SURGICAL GLOVE, POLYISOPRENE AND NEOPRENE BLEND: Brand: GAMMEX

## (undated) DEVICE — DRESSING ALG 3.5X10IN TAN CARBOXYMETHYL CELOS

## (undated) DEVICE — COVER XR CASS W21XL40IN UNIV ADH MICROSHIELD

## (undated) DEVICE — PENCIL ES BTTN SWCH W/ TIP HOLSTER E-Z CLN

## (undated) DEVICE — TOWEL,OR,DSP,ST,BLUE,DLX,2/PK,40PK/CS: Brand: MEDLINE

## (undated) DEVICE — APPLICATOR PREP 26ML CHG 2% ISO ALC 70%

## (undated) DEVICE — BIT DRL 2.7MM W/ STP DISP FOR COMPHSVE REV

## (undated) DEVICE — GAMMEX® NON-LATEX PI ORTHO SIZE 9, STERILE POLYISOPRENE POWDER-FREE SURGICAL GLOVE: Brand: GAMMEX

## (undated) DEVICE — SHOULDER P.A.D II: Brand: DEROYAL

## (undated) DEVICE — INSULATED NEEDLE ELECTRODE: Brand: EDGE

## (undated) DEVICE — SOLUTION IRRIG 1000ML 0.9% NACL USP BTL

## (undated) DEVICE — 3M™ STERI-STRIP™ REINFORCED ADHESIVE SKIN CLOSURES, R1547, 1/2 IN X 4 IN (12 MM X 100 MM), 6 STRIPS/ENVELOPE: Brand: 3M™ STERI-STRIP™

## (undated) DEVICE — 3M™ IOBAN™ 2 ANTIMICROBIAL INCISE DRAPE 6650EZ: Brand: IOBAN™ 2

## (undated) DEVICE — COVER,MAYO STAND,STERILE: Brand: MEDLINE

## (undated) DEVICE — PIN STNMN 3.2MMX9IN FOR COMPHSVE REV